# Patient Record
Sex: MALE | Race: WHITE | NOT HISPANIC OR LATINO | URBAN - METROPOLITAN AREA
[De-identification: names, ages, dates, MRNs, and addresses within clinical notes are randomized per-mention and may not be internally consistent; named-entity substitution may affect disease eponyms.]

---

## 2024-03-14 ENCOUNTER — OUTPATIENT (OUTPATIENT)
Dept: OUTPATIENT SERVICES | Facility: HOSPITAL | Age: 84
LOS: 1 days | Discharge: HOME | End: 2024-03-14
Payer: MEDICARE

## 2024-03-14 VITALS
HEIGHT: 69 IN | RESPIRATION RATE: 16 BRPM | HEART RATE: 89 BPM | SYSTOLIC BLOOD PRESSURE: 143 MMHG | OXYGEN SATURATION: 100 % | TEMPERATURE: 98 F | DIASTOLIC BLOOD PRESSURE: 78 MMHG | WEIGHT: 22.05 LBS

## 2024-03-14 DIAGNOSIS — Z98.890 OTHER SPECIFIED POSTPROCEDURAL STATES: Chronic | ICD-10-CM

## 2024-03-14 DIAGNOSIS — Z80.0 FAMILY HISTORY OF MALIGNANT NEOPLASM OF DIGESTIVE ORGANS: ICD-10-CM

## 2024-03-14 PROCEDURE — 88305 TISSUE EXAM BY PATHOLOGIST: CPT

## 2024-03-14 PROCEDURE — 88312 SPECIAL STAINS GROUP 1: CPT | Mod: 26

## 2024-03-14 PROCEDURE — 88305 TISSUE EXAM BY PATHOLOGIST: CPT | Mod: 26

## 2024-03-14 PROCEDURE — 88312 SPECIAL STAINS GROUP 1: CPT

## 2024-03-14 NOTE — ASU PREOP CHECKLIST - PATIENT'S PERSONAL PROPERTY REMOVED
Alert-The patient is alert, awake and responds to voice. The patient is oriented to time, place, and person. The triage nurse is able to obtain subjective information. glasses

## 2024-03-15 LAB — SURGICAL PATHOLOGY STUDY: SIGNIFICANT CHANGE UP

## 2024-03-20 DIAGNOSIS — K21.9 GASTRO-ESOPHAGEAL REFLUX DISEASE WITHOUT ESOPHAGITIS: ICD-10-CM

## 2024-03-20 DIAGNOSIS — Z12.11 ENCOUNTER FOR SCREENING FOR MALIGNANT NEOPLASM OF COLON: ICD-10-CM

## 2024-03-20 DIAGNOSIS — K57.30 DIVERTICULOSIS OF LARGE INTESTINE WITHOUT PERFORATION OR ABSCESS WITHOUT BLEEDING: ICD-10-CM

## 2024-03-20 DIAGNOSIS — R10.13 EPIGASTRIC PAIN: ICD-10-CM

## 2024-03-20 DIAGNOSIS — Z80.0 FAMILY HISTORY OF MALIGNANT NEOPLASM OF DIGESTIVE ORGANS: ICD-10-CM

## 2024-03-20 DIAGNOSIS — D12.0 BENIGN NEOPLASM OF CECUM: ICD-10-CM

## 2024-03-20 DIAGNOSIS — I10 ESSENTIAL (PRIMARY) HYPERTENSION: ICD-10-CM

## 2024-03-20 DIAGNOSIS — R10.11 RIGHT UPPER QUADRANT PAIN: ICD-10-CM

## 2024-03-21 PROBLEM — E78.00 PURE HYPERCHOLESTEROLEMIA, UNSPECIFIED: Chronic | Status: ACTIVE | Noted: 2024-03-14

## 2024-03-21 PROBLEM — I10 ESSENTIAL (PRIMARY) HYPERTENSION: Chronic | Status: ACTIVE | Noted: 2024-03-14

## 2024-03-22 ENCOUNTER — OUTPATIENT (OUTPATIENT)
Dept: OUTPATIENT SERVICES | Facility: HOSPITAL | Age: 84
LOS: 1 days | End: 2024-03-22
Payer: MEDICARE

## 2024-03-22 VITALS
HEART RATE: 79 BPM | RESPIRATION RATE: 16 BRPM | OXYGEN SATURATION: 99 % | SYSTOLIC BLOOD PRESSURE: 140 MMHG | DIASTOLIC BLOOD PRESSURE: 87 MMHG | WEIGHT: 218.92 LBS | HEIGHT: 69 IN | TEMPERATURE: 98 F

## 2024-03-22 DIAGNOSIS — M19.011 PRIMARY OSTEOARTHRITIS, RIGHT SHOULDER: ICD-10-CM

## 2024-03-22 DIAGNOSIS — Z01.818 ENCOUNTER FOR OTHER PREPROCEDURAL EXAMINATION: ICD-10-CM

## 2024-03-22 DIAGNOSIS — Z98.890 OTHER SPECIFIED POSTPROCEDURAL STATES: Chronic | ICD-10-CM

## 2024-03-22 DIAGNOSIS — Z29.9 ENCOUNTER FOR PROPHYLACTIC MEASURES, UNSPECIFIED: ICD-10-CM

## 2024-03-22 DIAGNOSIS — Z98.1 ARTHRODESIS STATUS: Chronic | ICD-10-CM

## 2024-03-22 LAB
ABO RH CONFIRMATION: SIGNIFICANT CHANGE UP
ALBUMIN SERPL ELPH-MCNC: 4.2 G/DL — SIGNIFICANT CHANGE UP (ref 3.3–5)
ALP SERPL-CCNC: 79 U/L — SIGNIFICANT CHANGE UP (ref 40–120)
ALT FLD-CCNC: 20 U/L — SIGNIFICANT CHANGE UP (ref 12–78)
ANION GAP SERPL CALC-SCNC: 6 MMOL/L — SIGNIFICANT CHANGE UP (ref 5–17)
APPEARANCE UR: CLEAR — SIGNIFICANT CHANGE UP
APTT BLD: 31.3 SEC — SIGNIFICANT CHANGE UP (ref 24.5–35.6)
AST SERPL-CCNC: 18 U/L — SIGNIFICANT CHANGE UP (ref 15–37)
BASOPHILS # BLD AUTO: 0.06 K/UL — SIGNIFICANT CHANGE UP (ref 0–0.2)
BASOPHILS NFR BLD AUTO: 0.7 % — SIGNIFICANT CHANGE UP (ref 0–2)
BILIRUB SERPL-MCNC: 0.7 MG/DL — SIGNIFICANT CHANGE UP (ref 0.2–1.2)
BILIRUB UR-MCNC: NEGATIVE — SIGNIFICANT CHANGE UP
BLD GP AB SCN SERPL QL: SIGNIFICANT CHANGE UP
BUN SERPL-MCNC: 22 MG/DL — SIGNIFICANT CHANGE UP (ref 7–23)
CALCIUM SERPL-MCNC: 9.7 MG/DL — SIGNIFICANT CHANGE UP (ref 8.5–10.1)
CHLORIDE SERPL-SCNC: 104 MMOL/L — SIGNIFICANT CHANGE UP (ref 96–108)
CO2 SERPL-SCNC: 27 MMOL/L — SIGNIFICANT CHANGE UP (ref 22–31)
COLOR SPEC: YELLOW — SIGNIFICANT CHANGE UP
CREAT SERPL-MCNC: 1.58 MG/DL — HIGH (ref 0.5–1.3)
DIFF PNL FLD: NEGATIVE — SIGNIFICANT CHANGE UP
EGFR: 43 ML/MIN/1.73M2 — LOW
EOSINOPHIL # BLD AUTO: 0.14 K/UL — SIGNIFICANT CHANGE UP (ref 0–0.5)
EOSINOPHIL NFR BLD AUTO: 1.7 % — SIGNIFICANT CHANGE UP (ref 0–6)
GLUCOSE SERPL-MCNC: 108 MG/DL — HIGH (ref 70–99)
GLUCOSE UR QL: NEGATIVE MG/DL — SIGNIFICANT CHANGE UP
HCT VFR BLD CALC: 50.2 % — HIGH (ref 39–50)
HGB BLD-MCNC: 16.5 G/DL — SIGNIFICANT CHANGE UP (ref 13–17)
IMM GRANULOCYTES NFR BLD AUTO: 0.5 % — SIGNIFICANT CHANGE UP (ref 0–0.9)
INR BLD: 0.96 RATIO — SIGNIFICANT CHANGE UP (ref 0.85–1.18)
KETONES UR-MCNC: NEGATIVE MG/DL — SIGNIFICANT CHANGE UP
LEUKOCYTE ESTERASE UR-ACNC: NEGATIVE — SIGNIFICANT CHANGE UP
LYMPHOCYTES # BLD AUTO: 1.66 K/UL — SIGNIFICANT CHANGE UP (ref 1–3.3)
LYMPHOCYTES # BLD AUTO: 20.1 % — SIGNIFICANT CHANGE UP (ref 13–44)
MCHC RBC-ENTMCNC: 28.3 PG — SIGNIFICANT CHANGE UP (ref 27–34)
MCHC RBC-ENTMCNC: 32.9 GM/DL — SIGNIFICANT CHANGE UP (ref 32–36)
MCV RBC AUTO: 86.1 FL — SIGNIFICANT CHANGE UP (ref 80–100)
MONOCYTES # BLD AUTO: 0.54 K/UL — SIGNIFICANT CHANGE UP (ref 0–0.9)
MONOCYTES NFR BLD AUTO: 6.5 % — SIGNIFICANT CHANGE UP (ref 2–14)
NEUTROPHILS # BLD AUTO: 5.81 K/UL — SIGNIFICANT CHANGE UP (ref 1.8–7.4)
NEUTROPHILS NFR BLD AUTO: 70.5 % — SIGNIFICANT CHANGE UP (ref 43–77)
NITRITE UR-MCNC: NEGATIVE — SIGNIFICANT CHANGE UP
PH UR: 7 — SIGNIFICANT CHANGE UP (ref 5–8)
PLATELET # BLD AUTO: 201 K/UL — SIGNIFICANT CHANGE UP (ref 150–400)
POTASSIUM SERPL-MCNC: 3.9 MMOL/L — SIGNIFICANT CHANGE UP (ref 3.5–5.3)
POTASSIUM SERPL-SCNC: 3.9 MMOL/L — SIGNIFICANT CHANGE UP (ref 3.5–5.3)
PROT SERPL-MCNC: 7.6 GM/DL — SIGNIFICANT CHANGE UP (ref 6–8.3)
PROT UR-MCNC: NEGATIVE MG/DL — SIGNIFICANT CHANGE UP
PROTHROM AB SERPL-ACNC: 10.9 SEC — SIGNIFICANT CHANGE UP (ref 9.5–13)
RBC # BLD: 5.83 M/UL — HIGH (ref 4.2–5.8)
RBC # FLD: 13.2 % — SIGNIFICANT CHANGE UP (ref 10.3–14.5)
SODIUM SERPL-SCNC: 137 MMOL/L — SIGNIFICANT CHANGE UP (ref 135–145)
SP GR SPEC: 1.01 — SIGNIFICANT CHANGE UP (ref 1–1.03)
UROBILINOGEN FLD QL: 1 MG/DL — SIGNIFICANT CHANGE UP (ref 0.2–1)
WBC # BLD: 8.25 K/UL — SIGNIFICANT CHANGE UP (ref 3.8–10.5)
WBC # FLD AUTO: 8.25 K/UL — SIGNIFICANT CHANGE UP (ref 3.8–10.5)

## 2024-03-22 PROCEDURE — 86900 BLOOD TYPING SEROLOGIC ABO: CPT

## 2024-03-22 PROCEDURE — 83036 HEMOGLOBIN GLYCOSYLATED A1C: CPT

## 2024-03-22 PROCEDURE — 87641 MR-STAPH DNA AMP PROBE: CPT

## 2024-03-22 PROCEDURE — 85610 PROTHROMBIN TIME: CPT

## 2024-03-22 PROCEDURE — 86901 BLOOD TYPING SEROLOGIC RH(D): CPT

## 2024-03-22 PROCEDURE — 36415 COLL VENOUS BLD VENIPUNCTURE: CPT

## 2024-03-22 PROCEDURE — 80053 COMPREHEN METABOLIC PANEL: CPT

## 2024-03-22 PROCEDURE — 93005 ELECTROCARDIOGRAM TRACING: CPT

## 2024-03-22 PROCEDURE — 93010 ELECTROCARDIOGRAM REPORT: CPT

## 2024-03-22 PROCEDURE — 86850 RBC ANTIBODY SCREEN: CPT

## 2024-03-22 PROCEDURE — 85730 THROMBOPLASTIN TIME PARTIAL: CPT

## 2024-03-22 PROCEDURE — 99214 OFFICE O/P EST MOD 30 MIN: CPT | Mod: 25

## 2024-03-22 PROCEDURE — 87640 STAPH A DNA AMP PROBE: CPT

## 2024-03-22 PROCEDURE — 81003 URINALYSIS AUTO W/O SCOPE: CPT

## 2024-03-22 PROCEDURE — 85025 COMPLETE CBC W/AUTO DIFF WBC: CPT

## 2024-03-22 RX ORDER — ALLOPURINOL 300 MG
0 TABLET ORAL
Refills: 0 | DISCHARGE

## 2024-03-22 RX ORDER — VALSARTAN 80 MG/1
1 TABLET ORAL
Refills: 0 | DISCHARGE

## 2024-03-22 RX ORDER — PANTOPRAZOLE SODIUM 20 MG/1
1 TABLET, DELAYED RELEASE ORAL
Refills: 0 | DISCHARGE

## 2024-03-22 NOTE — H&P PST ADULT - NSICDXPASTMEDICALHX_GEN_ALL_CORE_FT
PAST MEDICAL HISTORY:  BPH (benign prostatic hyperplasia)     Diverticulosis of large intestine     Dizziness     GERD (gastroesophageal reflux disease)     High cholesterol     History of indigestion     History of kidney stones     [place] (hard of hearing)     HTN (hypertension)     Osteoarthritis of right shoulder     Polyp of cecum

## 2024-03-22 NOTE — H&P PST ADULT - ASSESSMENT
83 years old male present to PST prior to right reverse total shoulder arthroplasty with Dr. Gtz.    Patient denies signs and symptoms of Covid 19 such as shortness of breath/ difficulty breathing, fever/chills, cough, muscle /body ache, headache, loss of taste or smell.    Plan   1. Education and Instructions given to patient regarding upcoming surgery  2. NPO as per ASU  3. Take the following medications with sips of water on the day of procedure: None  4. Use E-Z sponge as directed  5. Use Mupirocin as directed.  6. Drink a quart of extra  fluids the day before your surgery.  7. Medical optimization for surgery with Dr. Shalom Rey  8. CBC, CMP, PT/ INR and PTT, Urinalysis, Type and Screen, MRSA done in [facility] and sent to lab  9. EKG done in [facility]  10. Do not take NSAIDS, Aspirin, Herbal supplements, Multivitamins, Vitamin E or Fish oil 7 days prior to surgery    CAPRINI SCORE [CLOT]    AGE RELATED RISK FACTORS                                                       MOBILITY RELATED FACTORS  [ ] Age 41-60 years                                            (1 Point)                  [ ] Bed rest                                                        (1 Point)  [ ] Age: 61-74 years                                           (2 Points)                 [ ] Plaster cast                                                   (2 Points)  [x ] Age> 75 years                                              (3 Points)                 [ ] Bed bound for more than 72 hours                 (2 Points)    DISEASE RELATED RISK FACTORS                                               GENDER SPECIFIC FACTORS  [ ] Edema in the lower extremities                       (1 Point)                  [ ] Pregnancy                                                     (1 Point)  [ ] Varicose veins                                               (1 Point)                  [ ] Post-partum < 6 weeks                                   (1 Point)             [x ] BMI > 25 Kg/m2                                            (1 Point)                  [ ] Hormonal therapy  or oral contraception          (1 Point)                 [ ] Sepsis (in the previous month)                        (1 Point)                  [ ] History of pregnancy complications                 (1 point)  [ ] Pneumonia or serious lung disease                                               [ ] Unexplained or recurrent                     (1 Point)           (in the previous month)                               (1 Point)  [ ] Abnormal pulmonary function test                     (1 Point)                 SURGERY RELATED RISK FACTORS  [ ] Acute myocardial infarction                              (1 Point)                 [ ]  Section                                             (1 Point)  [ ] Congestive heart failure (in the previous month)  (1 Point)               [ ] Minor surgery                                                  (1 Point)   [ ] Inflammatory bowel disease                             (1 Point)                 [ ] Arthroscopic surgery                                        (2 Points)  [ ] Central venous access                                      (2 Points)                [ ] General surgery lasting more than 45 minutes   (2 Points)       [ ] Stroke (in the previous month)                          (5 Points)               [x ] Elective arthroplasty                                         (5 Points)            [ ] malignancy present or previous                      (2 points)                                                                                                                                   HEMATOLOGY RELATED FACTORS                                                 TRAUMA RELATED RISK FACTORS  [ ] Prior episodes of VTE                                     (3 Points)                 [ ] Fracture of the hip, pelvis, or leg                       (5 Points)  [ ] Positive family history for VTE                         (3 Points)                 [ ] Acute spinal cord injury (in the previous month)  (5 Points)  [ ] Prothrombin 80555 A                                     (3 Points)                 [ ] Paralysis  (less than 1 month)                             (5 Points)  [ ] Factor V Leiden                                             (3 Points)                  [ ] Multiple Trauma within 1 month                        (5 Points)  [ ] Lupus anticoagulants                                     (3 Points)                                                           [ ] Anticardiolipin antibodies                               (3 Points)                                                       [ ] High homocysteine in the blood                      (3 Points)                                             [ ] Other congenital or acquired thrombophilia      (3 Points)                                                [ ] Heparin induced thrombocytopenia                  (3 Points)                                          Total Score [      9    ]

## 2024-03-22 NOTE — H&P PST ADULT - NSANTHOSAYNRD_GEN_A_CORE
No. GENEVA screening performed.  STOP BANG Legend: 0-2 = LOW Risk; 3-4 = INTERMEDIATE Risk; 5-8 = HIGH Risk

## 2024-03-22 NOTE — H&P PST ADULT - HISTORY OF PRESENT ILLNESS
83 years old [place] male with a history of HTN, HLD, BPH, Kidney stones, dizziness, [place] (no hearing aids), recent colonoscopy (benign cecum polyp). Present to PST with osteoarthritis of right shoulder. Reports frequent aching pain to right shoulder radiating to fingers with certain movements. Sharp pain at times. Pain wakes up patient at night. Denies pain medication. Denies pain at present. Past surgery to bilateral shoulders Planned reverse total  replacement.

## 2024-03-22 NOTE — H&P PST ADULT - NSICDXPASTSURGICALHX_GEN_ALL_CORE_FT
PAST SURGICAL HISTORY:  H/O colonoscopy with polypectomy     History of lumbar fusion     History of lumbar laminectomy     History of shoulder surgery

## 2024-03-23 DIAGNOSIS — M19.011 PRIMARY OSTEOARTHRITIS, RIGHT SHOULDER: ICD-10-CM

## 2024-03-23 DIAGNOSIS — Z01.818 ENCOUNTER FOR OTHER PREPROCEDURAL EXAMINATION: ICD-10-CM

## 2024-03-23 LAB
A1C WITH ESTIMATED AVERAGE GLUCOSE RESULT: 5.6 % — SIGNIFICANT CHANGE UP (ref 4–5.6)
ESTIMATED AVERAGE GLUCOSE: 114 MG/DL — SIGNIFICANT CHANGE UP (ref 68–114)
MRSA PCR RESULT.: SIGNIFICANT CHANGE UP
S AUREUS DNA NOSE QL NAA+PROBE: SIGNIFICANT CHANGE UP

## 2024-03-25 RX ORDER — DEXAMETHASONE 0.5 MG/5ML
8 ELIXIR ORAL ONCE
Refills: 0 | Status: COMPLETED | OUTPATIENT
Start: 2024-03-29 | End: 2024-03-29

## 2024-03-28 ENCOUNTER — OUTPATIENT (OUTPATIENT)
Dept: INPATIENT UNIT | Facility: HOSPITAL | Age: 84
LOS: 1 days | Discharge: HOME | End: 2024-03-28
Payer: MEDICARE

## 2024-03-28 VITALS
HEART RATE: 85 BPM | TEMPERATURE: 98 F | OXYGEN SATURATION: 100 % | WEIGHT: 218.92 LBS | HEIGHT: 69 IN | SYSTOLIC BLOOD PRESSURE: 100 MMHG | DIASTOLIC BLOOD PRESSURE: 160 MMHG | RESPIRATION RATE: 12 BRPM

## 2024-03-28 DIAGNOSIS — M19.011 PRIMARY OSTEOARTHRITIS, RIGHT SHOULDER: ICD-10-CM

## 2024-03-28 DIAGNOSIS — Z98.890 OTHER SPECIFIED POSTPROCEDURAL STATES: Chronic | ICD-10-CM

## 2024-03-28 DIAGNOSIS — Z98.1 ARTHRODESIS STATUS: Chronic | ICD-10-CM

## 2024-03-28 LAB
ANION GAP SERPL CALC-SCNC: 7 MMOL/L — SIGNIFICANT CHANGE UP (ref 5–17)
BUN SERPL-MCNC: 20 MG/DL — SIGNIFICANT CHANGE UP (ref 7–23)
CALCIUM SERPL-MCNC: 8.3 MG/DL — LOW (ref 8.5–10.1)
CHLORIDE SERPL-SCNC: 106 MMOL/L — SIGNIFICANT CHANGE UP (ref 96–108)
CO2 SERPL-SCNC: 22 MMOL/L — SIGNIFICANT CHANGE UP (ref 22–31)
CREAT SERPL-MCNC: 1.44 MG/DL — HIGH (ref 0.5–1.3)
EGFR: 48 ML/MIN/1.73M2 — LOW
GLUCOSE BLDC GLUCOMTR-MCNC: 100 MG/DL — HIGH (ref 70–99)
GLUCOSE BLDC GLUCOMTR-MCNC: 144 MG/DL — HIGH (ref 70–99)
GLUCOSE BLDC GLUCOMTR-MCNC: 165 MG/DL — HIGH (ref 70–99)
GLUCOSE SERPL-MCNC: 163 MG/DL — HIGH (ref 70–99)
HCT VFR BLD CALC: 35.8 % — LOW (ref 39–50)
HGB BLD-MCNC: 11.7 G/DL — LOW (ref 13–17)
MCHC RBC-ENTMCNC: 28.2 PG — SIGNIFICANT CHANGE UP (ref 27–34)
MCHC RBC-ENTMCNC: 32.7 GM/DL — SIGNIFICANT CHANGE UP (ref 32–36)
MCV RBC AUTO: 86.3 FL — SIGNIFICANT CHANGE UP (ref 80–100)
PLATELET # BLD AUTO: 135 K/UL — LOW (ref 150–400)
POTASSIUM SERPL-MCNC: 3.8 MMOL/L — SIGNIFICANT CHANGE UP (ref 3.5–5.3)
POTASSIUM SERPL-SCNC: 3.8 MMOL/L — SIGNIFICANT CHANGE UP (ref 3.5–5.3)
RBC # BLD: 4.15 M/UL — LOW (ref 4.2–5.8)
RBC # FLD: 13 % — SIGNIFICANT CHANGE UP (ref 10.3–14.5)
SODIUM SERPL-SCNC: 135 MMOL/L — SIGNIFICANT CHANGE UP (ref 135–145)
WBC # BLD: 11.16 K/UL — HIGH (ref 3.8–10.5)
WBC # FLD AUTO: 11.16 K/UL — HIGH (ref 3.8–10.5)

## 2024-03-28 PROCEDURE — 36415 COLL VENOUS BLD VENIPUNCTURE: CPT

## 2024-03-28 PROCEDURE — 80048 BASIC METABOLIC PNL TOTAL CA: CPT

## 2024-03-28 PROCEDURE — 88300 SURGICAL PATH GROSS: CPT

## 2024-03-28 PROCEDURE — 82962 GLUCOSE BLOOD TEST: CPT

## 2024-03-28 PROCEDURE — C1776: CPT

## 2024-03-28 PROCEDURE — 88300 SURGICAL PATH GROSS: CPT | Mod: 26

## 2024-03-28 PROCEDURE — 73030 X-RAY EXAM OF SHOULDER: CPT | Mod: 26,RT

## 2024-03-28 PROCEDURE — 73030 X-RAY EXAM OF SHOULDER: CPT | Mod: RT

## 2024-03-28 PROCEDURE — C1713: CPT

## 2024-03-28 PROCEDURE — 23472 RECONSTRUCT SHOULDER JOINT: CPT | Mod: AS

## 2024-03-28 PROCEDURE — C9399: CPT

## 2024-03-28 PROCEDURE — 85027 COMPLETE CBC AUTOMATED: CPT

## 2024-03-28 RX ORDER — SENNA PLUS 8.6 MG/1
2 TABLET ORAL
Qty: 28 | Refills: 0
Start: 2024-03-28 | End: 2024-04-10

## 2024-03-28 RX ORDER — CEFAZOLIN SODIUM 1 G
2000 VIAL (EA) INJECTION EVERY 8 HOURS
Refills: 0 | Status: COMPLETED | OUTPATIENT
Start: 2024-03-28 | End: 2024-03-29

## 2024-03-28 RX ORDER — OXYCODONE HYDROCHLORIDE 5 MG/1
1 TABLET ORAL
Qty: 30 | Refills: 0
Start: 2024-03-28 | End: 2024-04-01

## 2024-03-28 RX ORDER — SODIUM CHLORIDE 9 MG/ML
1000 INJECTION, SOLUTION INTRAVENOUS
Refills: 0 | Status: DISCONTINUED | OUTPATIENT
Start: 2024-03-28 | End: 2024-03-29

## 2024-03-28 RX ORDER — ONDANSETRON 8 MG/1
4 TABLET, FILM COATED ORAL EVERY 6 HOURS
Refills: 0 | Status: DISCONTINUED | OUTPATIENT
Start: 2024-03-28 | End: 2024-03-29

## 2024-03-28 RX ORDER — ACETAMINOPHEN 500 MG
2 TABLET ORAL
Qty: 84 | Refills: 0
Start: 2024-03-28 | End: 2024-04-10

## 2024-03-28 RX ORDER — HYDROMORPHONE HYDROCHLORIDE 2 MG/ML
0.5 INJECTION INTRAMUSCULAR; INTRAVENOUS; SUBCUTANEOUS EVERY 4 HOURS
Refills: 0 | Status: DISCONTINUED | OUTPATIENT
Start: 2024-03-28 | End: 2024-03-29

## 2024-03-28 RX ORDER — SIMVASTATIN 20 MG/1
40 TABLET, FILM COATED ORAL AT BEDTIME
Refills: 0 | Status: DISCONTINUED | OUTPATIENT
Start: 2024-03-28 | End: 2024-03-29

## 2024-03-28 RX ORDER — ALLOPURINOL 300 MG
1 TABLET ORAL
Refills: 0 | DISCHARGE

## 2024-03-28 RX ORDER — ONDANSETRON 8 MG/1
1 TABLET, FILM COATED ORAL
Qty: 15 | Refills: 0
Start: 2024-03-28 | End: 2024-04-01

## 2024-03-28 RX ORDER — ASPIRIN/CALCIUM CARB/MAGNESIUM 324 MG
1 TABLET ORAL
Qty: 14 | Refills: 0
Start: 2024-03-28 | End: 2024-04-10

## 2024-03-28 RX ORDER — POLYETHYLENE GLYCOL 3350 17 G/17G
17 POWDER, FOR SOLUTION ORAL
Qty: 1 | Refills: 0
Start: 2024-03-28 | End: 2024-04-10

## 2024-03-28 RX ORDER — HYDROMORPHONE HYDROCHLORIDE 2 MG/ML
0.5 INJECTION INTRAMUSCULAR; INTRAVENOUS; SUBCUTANEOUS
Refills: 0 | Status: DISCONTINUED | OUTPATIENT
Start: 2024-03-28 | End: 2024-03-29

## 2024-03-28 RX ORDER — CEFAZOLIN SODIUM 1 G
2000 VIAL (EA) INJECTION EVERY 8 HOURS
Refills: 0 | Status: DISCONTINUED | OUTPATIENT
Start: 2024-03-28 | End: 2024-03-28

## 2024-03-28 RX ORDER — OXYCODONE HYDROCHLORIDE 5 MG/1
10 TABLET ORAL EVERY 4 HOURS
Refills: 0 | Status: DISCONTINUED | OUTPATIENT
Start: 2024-03-28 | End: 2024-03-29

## 2024-03-28 RX ORDER — OXYCODONE HYDROCHLORIDE 5 MG/1
5 TABLET ORAL ONCE
Refills: 0 | Status: DISCONTINUED | OUTPATIENT
Start: 2024-03-28 | End: 2024-03-28

## 2024-03-28 RX ORDER — PANTOPRAZOLE SODIUM 20 MG/1
40 TABLET, DELAYED RELEASE ORAL ONCE
Refills: 0 | Status: COMPLETED | OUTPATIENT
Start: 2024-03-28 | End: 2024-03-28

## 2024-03-28 RX ORDER — ALLOPURINOL 300 MG
100 TABLET ORAL DAILY
Refills: 0 | Status: DISCONTINUED | OUTPATIENT
Start: 2024-03-28 | End: 2024-03-29

## 2024-03-28 RX ORDER — SODIUM CHLORIDE 9 MG/ML
1000 INJECTION, SOLUTION INTRAVENOUS
Refills: 0 | Status: DISCONTINUED | OUTPATIENT
Start: 2024-03-28 | End: 2024-03-28

## 2024-03-28 RX ORDER — PANTOPRAZOLE SODIUM 20 MG/1
40 TABLET, DELAYED RELEASE ORAL
Refills: 0 | Status: DISCONTINUED | OUTPATIENT
Start: 2024-03-28 | End: 2024-03-29

## 2024-03-28 RX ORDER — ACETAMINOPHEN 500 MG
1000 TABLET ORAL EVERY 8 HOURS
Refills: 0 | Status: DISCONTINUED | OUTPATIENT
Start: 2024-03-28 | End: 2024-03-29

## 2024-03-28 RX ORDER — PANTOPRAZOLE SODIUM 20 MG/1
1 TABLET, DELAYED RELEASE ORAL
Refills: 0 | DISCHARGE

## 2024-03-28 RX ORDER — ONDANSETRON 8 MG/1
4 TABLET, FILM COATED ORAL ONCE
Refills: 0 | Status: DISCONTINUED | OUTPATIENT
Start: 2024-03-28 | End: 2024-03-28

## 2024-03-28 RX ORDER — FENTANYL CITRATE 50 UG/ML
25 INJECTION INTRAVENOUS
Refills: 0 | Status: DISCONTINUED | OUTPATIENT
Start: 2024-03-28 | End: 2024-03-28

## 2024-03-28 RX ORDER — OXYCODONE HYDROCHLORIDE 5 MG/1
5 TABLET ORAL EVERY 4 HOURS
Refills: 0 | Status: DISCONTINUED | OUTPATIENT
Start: 2024-03-28 | End: 2024-03-29

## 2024-03-28 RX ORDER — CELECOXIB 200 MG/1
200 CAPSULE ORAL EVERY 12 HOURS
Refills: 0 | Status: DISCONTINUED | OUTPATIENT
Start: 2024-03-28 | End: 2024-03-29

## 2024-03-28 RX ORDER — METOCLOPRAMIDE HCL 10 MG
5 TABLET ORAL EVERY OTHER DAY
Refills: 0 | Status: DISCONTINUED | OUTPATIENT
Start: 2024-03-29 | End: 2024-03-29

## 2024-03-28 RX ORDER — POLYETHYLENE GLYCOL 3350 17 G/17G
17 POWDER, FOR SOLUTION ORAL AT BEDTIME
Refills: 0 | Status: DISCONTINUED | OUTPATIENT
Start: 2024-03-28 | End: 2024-03-29

## 2024-03-28 RX ORDER — SENNA PLUS 8.6 MG/1
2 TABLET ORAL AT BEDTIME
Refills: 0 | Status: DISCONTINUED | OUTPATIENT
Start: 2024-03-28 | End: 2024-03-29

## 2024-03-28 RX ORDER — VALSARTAN 80 MG/1
160 TABLET ORAL DAILY
Refills: 0 | Status: DISCONTINUED | OUTPATIENT
Start: 2024-03-28 | End: 2024-03-29

## 2024-03-28 RX ADMIN — OXYCODONE HYDROCHLORIDE 10 MILLIGRAM(S): 5 TABLET ORAL at 20:32

## 2024-03-28 RX ADMIN — FENTANYL CITRATE 25 MICROGRAM(S): 50 INJECTION INTRAVENOUS at 11:28

## 2024-03-28 RX ADMIN — HYDROMORPHONE HYDROCHLORIDE 0.5 MILLIGRAM(S): 2 INJECTION INTRAMUSCULAR; INTRAVENOUS; SUBCUTANEOUS at 12:33

## 2024-03-28 RX ADMIN — FENTANYL CITRATE 25 MICROGRAM(S): 50 INJECTION INTRAVENOUS at 11:57

## 2024-03-28 RX ADMIN — HYDROMORPHONE HYDROCHLORIDE 0.5 MILLIGRAM(S): 2 INJECTION INTRAMUSCULAR; INTRAVENOUS; SUBCUTANEOUS at 12:59

## 2024-03-28 RX ADMIN — SIMVASTATIN 40 MILLIGRAM(S): 20 TABLET, FILM COATED ORAL at 21:59

## 2024-03-28 RX ADMIN — SENNA PLUS 2 TABLET(S): 8.6 TABLET ORAL at 21:59

## 2024-03-28 RX ADMIN — CELECOXIB 200 MILLIGRAM(S): 200 CAPSULE ORAL at 21:59

## 2024-03-28 RX ADMIN — OXYCODONE HYDROCHLORIDE 10 MILLIGRAM(S): 5 TABLET ORAL at 20:02

## 2024-03-28 RX ADMIN — OXYCODONE HYDROCHLORIDE 5 MILLIGRAM(S): 5 TABLET ORAL at 12:43

## 2024-03-28 RX ADMIN — FENTANYL CITRATE 25 MICROGRAM(S): 50 INJECTION INTRAVENOUS at 11:48

## 2024-03-28 RX ADMIN — Medication 2000 MILLIGRAM(S): at 21:59

## 2024-03-28 RX ADMIN — HYDROMORPHONE HYDROCHLORIDE 0.5 MILLIGRAM(S): 2 INJECTION INTRAMUSCULAR; INTRAVENOUS; SUBCUTANEOUS at 18:45

## 2024-03-28 RX ADMIN — Medication 1000 MILLIGRAM(S): at 21:59

## 2024-03-28 RX ADMIN — PANTOPRAZOLE SODIUM 40 MILLIGRAM(S): 20 TABLET, DELAYED RELEASE ORAL at 06:36

## 2024-03-28 RX ADMIN — OXYCODONE HYDROCHLORIDE 5 MILLIGRAM(S): 5 TABLET ORAL at 11:48

## 2024-03-28 RX ADMIN — OXYCODONE HYDROCHLORIDE 5 MILLIGRAM(S): 5 TABLET ORAL at 11:46

## 2024-03-28 RX ADMIN — OXYCODONE HYDROCHLORIDE 5 MILLIGRAM(S): 5 TABLET ORAL at 11:23

## 2024-03-28 RX ADMIN — FENTANYL CITRATE 25 MICROGRAM(S): 50 INJECTION INTRAVENOUS at 12:43

## 2024-03-28 RX ADMIN — HYDROMORPHONE HYDROCHLORIDE 0.5 MILLIGRAM(S): 2 INJECTION INTRAMUSCULAR; INTRAVENOUS; SUBCUTANEOUS at 13:30

## 2024-03-28 RX ADMIN — HYDROMORPHONE HYDROCHLORIDE 0.5 MILLIGRAM(S): 2 INJECTION INTRAMUSCULAR; INTRAVENOUS; SUBCUTANEOUS at 19:10

## 2024-03-28 RX ADMIN — SODIUM CHLORIDE 75 MILLILITER(S): 9 INJECTION, SOLUTION INTRAVENOUS at 22:03

## 2024-03-28 RX ADMIN — POLYETHYLENE GLYCOL 3350 17 GRAM(S): 17 POWDER, FOR SOLUTION ORAL at 22:00

## 2024-03-28 NOTE — ASU DISCHARGE PLAN (ADULT/PEDIATRIC) - CARE PROVIDER_API CALL
Debi Gtz  Orthopaedic Surgery  [address] [place] [address]  Phone: [phone]  Fax: [phone]  Follow Up Time:

## 2024-03-28 NOTE — DISCHARGE NOTE PROVIDER - CARE PROVIDER_API CALL
Debi Gtz  Orthopaedic Surgery  [address] [place] [address]  Phone: [phone]  Fax: [phone]  Follow Up Time:

## 2024-03-28 NOTE — PROGRESS NOTE ADULT - SUBJECTIVE AND OBJECTIVE BOX
Patient tolerated the procedure well. Patient seen and examined at bedside. No acute complaints at this time. Pain well controlled. Denies chest pain, shortness of breath, nausea or vomiting.     LABS:                VITAL SIGNS:  T(C): 36.4 (03-28-24 @ 10:48), Max: 36.9 (03-28-24 @ 06:29)  HR: 94 (03-28-24 @ 13:15) (74 - 97)  BP: 144/83 (03-28-24 @ 13:15) (100/160 - 145/83)  RR: 18 (03-28-24 @ 13:15) (12 - 20)  SpO2: 98% (03-28-24 @ 13:15) (93% - 100%)    General: NAD, resting comfortably in bed  RUE:   Dressing C/D/I  Compartments soft and compressible  No calf tenderness bilaterally  Axillary/Musculocutaneous/Radial/Median/AIN/PIN unable to assess 2/2 continued effects of block  SILT C5-T1  2+ radial pulses            A/P:  83y m s/p R rTSA POD 0  -PT/OT   -NWB RUE in sling  -Pain Control  -DVT ppx SCDs  -Continue perioperative abx x 24 hours  -FU AM Labs  -Rest, ice, compress and elevate the extremity as we needed  -Incentive Spirometry  -Medical management appreciated  -Dispo pending PT eval  -Will discuss with attending and will advise if plan changes

## 2024-03-28 NOTE — DISCHARGE NOTE PROVIDER - HOSPITAL COURSE
H&P:  Pt is a 83y Male    PAST MEDICAL & SURGICAL HISTORY:  HTN (hypertension)      High cholesterol      Dizziness      [place] (hard of hearing)      History of kidney stones      BPH (benign prostatic hyperplasia)      GERD (gastroesophageal reflux disease)      Polyp of cecum      Diverticulosis of large intestine      Osteoarthritis of right shoulder      History of indigestion      H/O colonoscopy with polypectomy      History of lumbar laminectomy      History of lumbar fusion      History of shoulder surgery           Now s/p Right Reverse Total Shoulder Arthroplasty. Pt is afebrile with stable vital signs. Pain is controlled. Alert and Oriented. Exam reveals intact AIN/PIN, wrist flexion and wrist extension, 2+Radial Pulse. Dressing is clean and dry.    Hospital Course:  Patient presented to [facility] medically cleared for Right Reverse Total Shoulder Arthroplasty Replacement Surgery, having failed outpatient non-operative conservative management. Prophylactic antibiotics were started before the procedure and continued for 24 hours. They were admitted after surgery to the orthopedic floor.  There were no complications during the hospital stay.     Routine consult was obtained from Physical Therapy and Occupational Therapy. Consult to Hospitalist for Medical Co-management. Patient was placed on anticoagulation post-operatively while inhouse.  Pertinent home medications were continued.  Daily labs were followed.      There were no post-operative issues and patient was OOB with PT/OT. Anticipate POD 0 DC to home. The orthopedic Attending is aware and agrees.

## 2024-03-28 NOTE — DISCHARGE NOTE PROVIDER - NSDCMRMEDTOKEN_GEN_ALL_CORE_FT
allopurinol 100 mg oral tablet: 1 tab(s) orally once a day  Aspirin  mg oral delayed release tablet: 1 tab(s) orally once a day  Betaserc 24 mtablet orally 2 times a day (European Brand)  MiraLax oral powder for reconstitution: 17 gram(s) orally once a day as needed for  constipation  ondansetron 4 mg oral tablet: 1 tab(s) orally every 8 hours as needed for  nausea  oxyCODONE 5 mg oral tablet: 1 tab(s) orally every 4 hours as needed for  severe pain MDD: 6  pantoprazole 40 mg oral delayed release tablet: 1 tab(s) orally once a day  Senna 8.6 mg oral tablet: 2 tab(s) orally once a day (at bedtime) as needed for  constipation  Tomid 5m tablet every 2 days for indigestion (European Brand)  Tylenol Extra Strength 500 mg oral tablet: 2 tab(s) orally every 8 hours  Valsacombi 160mg/12.5 mg: once  a day in the morning (European Brand)  Vasilip 40 mg: one tablet orally once a day (European brand)

## 2024-03-28 NOTE — ASU PATIENT PROFILE, ADULT - FALL HARM RISK - UNIVERSAL INTERVENTIONS
Bed in lowest position, wheels locked, appropriate side rails in place/Call bell, personal items and telephone in reach/Non-slip footwear when patient is out of bed/[place] to call system/Physically safe environment - no spills, clutter or unnecessary equipment/Purposeful Proactive Rounding/Room/bathroom lighting operational, light cord in reach

## 2024-03-28 NOTE — DISCHARGE NOTE PROVIDER - NSDCFUADDINST_GEN_ALL_CORE_FT
Discharge Instructions for Right Reverse Total Shoulder Arthroplasty:    1. Activity: No ROM until seen by Dr. Blackwell. Maintain sling at all times. Ok to straighten elbow with arm braced at your side so elbow doesn't get stiff. Wiggle your fingers often and exercise your hand.     2. Call with fever over 101, wound redness, drainage.    3. Wound care: Keep bandage on. Do not remove it. It will be removed in the office. There are no stitches or staples to remove.     4. Bathing: Recommend sponge bathing.  IF you shower it must be with help of another person and with a detachable shower head from waist down only so you keep your bandage dry.     5. Continue to apply ICE packs.    6. DVT PE Prophylaxis: Aspirin 35mg PO daily for 14 days. See med Rec.    7. Follow Up: Orthopedics, Dr. BLACKWELL  7-10 days after discharage from hospital. Call to schedule. eRx sent to your pharmacy for .

## 2024-03-28 NOTE — ASU DISCHARGE PLAN (ADULT/PEDIATRIC) - ASU DC SPECIAL INSTRUCTIONSFT
1. Do not lift anything or bear weight on your right arm. Keep in immobilizer.   2. Keep dressing clean, dry, intact.  3. Take medications as prescribed.  4. Follow up with Dr. Gtz in 10-14 days. 1. Do not lift anything or bear weight on your right arm. Keep in immobilizer until follow up appointment.   2. Keep dressing clean, dry, intact until Monday. Then may remove dressing and may shower allowing water to run over incision but do not scrub or submerge.   3. Take medications as prescribed.  4. Follow up with Dr. Gtz in 1 week. Call office to schedule.

## 2024-03-28 NOTE — ASU PATIENT PROFILE, ADULT - NSICDXPASTMEDICALHX_GEN_ALL_CORE_FT
PAST MEDICAL HISTORY:  BPH (benign prostatic hyperplasia)     Diverticulosis of large intestine     Dizziness     GERD (gastroesophageal reflux disease)     High cholesterol     History of indigestion     History of kidney stones     [place] (hard of hearing)     HTN (hypertension)     Osteoarthritis of right shoulder     Polyp of cecum

## 2024-03-28 NOTE — ASU PATIENT PROFILE, ADULT - AS SC BRADEN MOBILITY
Patient states he is feeling much better. States no concerns. Call light within reach      (4) no limitation

## 2024-03-28 NOTE — ASU DISCHARGE PLAN (ADULT/PEDIATRIC) - NS MD DC FALL RISK RISK
For information on Fall & Injury Prevention, visit: [email or web address] OR  [email or web address] OR  https://www.cdc.gov/steadi/patient.html

## 2024-03-29 VITALS
DIASTOLIC BLOOD PRESSURE: 58 MMHG | RESPIRATION RATE: 18 BRPM | TEMPERATURE: 98 F | OXYGEN SATURATION: 96 % | SYSTOLIC BLOOD PRESSURE: 116 MMHG | HEART RATE: 63 BPM

## 2024-03-29 LAB
ANION GAP SERPL CALC-SCNC: 5 MMOL/L — SIGNIFICANT CHANGE UP (ref 5–17)
BUN SERPL-MCNC: 26 MG/DL — HIGH (ref 7–23)
CALCIUM SERPL-MCNC: 8.6 MG/DL — SIGNIFICANT CHANGE UP (ref 8.5–10.1)
CHLORIDE SERPL-SCNC: 102 MMOL/L — SIGNIFICANT CHANGE UP (ref 96–108)
CO2 SERPL-SCNC: 24 MMOL/L — SIGNIFICANT CHANGE UP (ref 22–31)
CREAT SERPL-MCNC: 1.67 MG/DL — HIGH (ref 0.5–1.3)
EGFR: 40 ML/MIN/1.73M2 — LOW
GLUCOSE SERPL-MCNC: 127 MG/DL — HIGH (ref 70–99)
HCT VFR BLD CALC: 36.7 % — LOW (ref 39–50)
HGB BLD-MCNC: 12.1 G/DL — LOW (ref 13–17)
MCHC RBC-ENTMCNC: 28.4 PG — SIGNIFICANT CHANGE UP (ref 27–34)
MCHC RBC-ENTMCNC: 33 GM/DL — SIGNIFICANT CHANGE UP (ref 32–36)
MCV RBC AUTO: 86.2 FL — SIGNIFICANT CHANGE UP (ref 80–100)
PLATELET # BLD AUTO: 148 K/UL — LOW (ref 150–400)
POTASSIUM SERPL-MCNC: 4.9 MMOL/L — SIGNIFICANT CHANGE UP (ref 3.5–5.3)
POTASSIUM SERPL-SCNC: 4.9 MMOL/L — SIGNIFICANT CHANGE UP (ref 3.5–5.3)
RBC # BLD: 4.26 M/UL — SIGNIFICANT CHANGE UP (ref 4.2–5.8)
RBC # FLD: 13.1 % — SIGNIFICANT CHANGE UP (ref 10.3–14.5)
SODIUM SERPL-SCNC: 131 MMOL/L — LOW (ref 135–145)
WBC # BLD: 10.07 K/UL — SIGNIFICANT CHANGE UP (ref 3.8–10.5)
WBC # FLD AUTO: 10.07 K/UL — SIGNIFICANT CHANGE UP (ref 3.8–10.5)

## 2024-03-29 RX ORDER — SODIUM CHLORIDE 9 MG/ML
500 INJECTION, SOLUTION INTRAVENOUS ONCE
Refills: 0 | Status: COMPLETED | OUTPATIENT
Start: 2024-03-29 | End: 2024-03-29

## 2024-03-29 RX ORDER — SODIUM CHLORIDE 9 MG/ML
1000 INJECTION INTRAMUSCULAR; INTRAVENOUS; SUBCUTANEOUS
Refills: 0 | Status: DISCONTINUED | OUTPATIENT
Start: 2024-03-29 | End: 2024-03-29

## 2024-03-29 RX ADMIN — OXYCODONE HYDROCHLORIDE 10 MILLIGRAM(S): 5 TABLET ORAL at 05:07

## 2024-03-29 RX ADMIN — Medication 1000 MILLIGRAM(S): at 05:07

## 2024-03-29 RX ADMIN — PANTOPRAZOLE SODIUM 40 MILLIGRAM(S): 20 TABLET, DELAYED RELEASE ORAL at 05:07

## 2024-03-29 RX ADMIN — SODIUM CHLORIDE 500 MILLILITER(S): 9 INJECTION, SOLUTION INTRAVENOUS at 12:16

## 2024-03-29 RX ADMIN — Medication 100 MILLIGRAM(S): at 10:35

## 2024-03-29 RX ADMIN — Medication 101.6 MILLIGRAM(S): at 06:00

## 2024-03-29 RX ADMIN — OXYCODONE HYDROCHLORIDE 10 MILLIGRAM(S): 5 TABLET ORAL at 05:37

## 2024-03-29 RX ADMIN — CELECOXIB 200 MILLIGRAM(S): 200 CAPSULE ORAL at 10:33

## 2024-03-29 RX ADMIN — OXYCODONE HYDROCHLORIDE 10 MILLIGRAM(S): 5 TABLET ORAL at 10:35

## 2024-03-29 RX ADMIN — Medication 2000 MILLIGRAM(S): at 06:00

## 2024-03-29 RX ADMIN — Medication 5 MILLIGRAM(S): at 10:35

## 2024-03-29 RX ADMIN — VALSARTAN 160 MILLIGRAM(S): 80 TABLET ORAL at 10:35

## 2024-03-29 NOTE — OCCUPATIONAL THERAPY INITIAL EVALUATION ADULT - PRECAUTIONS/LIMITATIONS, REHAB EVAL
diet: regular, NO shoulder ROM, elbow/wrist/hand ROM okay/fall precautions/surgical precautions diet: regular, NO shoulder ROM, elbow/wrist/hand ROM okay/fall precautions/hearing precautions/surgical precautions

## 2024-03-29 NOTE — OCCUPATIONAL THERAPY INITIAL EVALUATION ADULT - PERTINENT HX OF CURRENT PROBLEM, REHAB EVAL
Per PST H&P- pt is a 83 y/p [place] male with a PMHx of HTN, HLD, BPH, Kidney stones, dizziness, [place] (no hearing aids), recent colonoscopy (benign cecum polyp). Present to PST with osteoarthritis of right shoulder. Reports frequent aching pain to right shoulder radiating to fingers with certain movements. Sharp pain at times. Pain wakes up patient at night. Past surgery to bilateral shoulders. Pt is now s/p R rTSA.

## 2024-03-29 NOTE — OCCUPATIONAL THERAPY INITIAL EVALUATION ADULT - NSOTDISCHREC_GEN_A_CORE
home with family SUPERVISION and assist 2* cognition/decreased safety awareness, and assist needed for ADL/IADL tasks

## 2024-03-29 NOTE — OCCUPATIONAL THERAPY INITIAL EVALUATION ADULT - NSACTIVITYREC_GEN_A_OT
Pt presents with impaired balance, endurance and muscle strength that will benefit from skilled OT to improve independence in ADLs, reduce fall risk and chance for readmission. Pt educated extensively on ROM and shoulder precautions for RUE and impact on ADL/IADL tasks- handout provided to promote carryover. Patient educated regarding fall prevention and home/hospital safety. Pt educated on use of AE/DME recommended for safety & the need to call for assistance. Role of OT and patient goals discussed.

## 2024-03-29 NOTE — OCCUPATIONAL THERAPY INITIAL EVALUATION ADULT - PERSONAL SAFETY AND JUDGMENT, REHAB EVAL
pt with poor safety awareness, adherence and insight to deficits/precautions post surgery/impaired/at risk behaviors demonstrated

## 2024-03-29 NOTE — PROGRESS NOTE ADULT - SUBJECTIVE AND OBJECTIVE BOX
Patient seen and examined at bedside. Pain well controlled with medication. Patient denies any numbness, tingling, weakness, or any other orthopaedic complaint. Denies N/V/CP/SOB.    LABS:                        11.7   11.16 )-----------( 135      ( 28 Mar 2024 12:56 )             35.8     03-28    135  |  106  |  20  ----------------------------<  163<H>  3.8   |  22  |  1.44<H>    Ca    8.3<L>      28 Mar 2024 12:56        Urinalysis Basic - ( 28 Mar 2024 12:56 )    Color: x / Appearance: x / SG: x / pH: x  Gluc: 163 mg/dL / Ketone: x  / Bili: x / Urobili: x   Blood: x / Protein: x / Nitrite: x   Leuk Esterase: x / RBC: x / WBC x   Sq Epi: x / Non Sq Epi: x / Bacteria: x        VITAL SIGNS:  T(C): 36.4 (03-29-24 @ 04:00), Max: 36.9 (03-28-24 @ 06:29)  HR: 79 (03-29-24 @ 04:00) (74 - 103)  BP: 120/64 (03-29-24 @ 04:00) (100/160 - 145/83)  RR: 17 (03-29-24 @ 04:00) (12 - 21)  SpO2: 97% (03-29-24 @ 04:00) (93% - 100%)    General: NAD, resting comfortably in bed  RUE:   Dressing C/D/I  Compartments soft and compressible  No calf tenderness bilaterally  +Axillary/Musculocutaneous/Radial/Median/AIN/PIN   SILT C5-T1  2+ radial pulses            A/P:  83y m s/p R rTSA POD 1  -PT/OT   -NWB RUE in sling  -Pain Control  -DVT ppx SCDs  -Continue perioperative abx x 24 hours  -FU AM Labs  -Rest, ice, compress and elevate the extremity as we needed  -Incentive Spirometry  -Medical management appreciated  -Dispo pending PT eval  -Will discuss with attending and will advise if plan changes

## 2024-03-29 NOTE — OCCUPATIONAL THERAPY INITIAL EVALUATION ADULT - MODALITIES TREATMENT COMMENTS
Pt left seated in bedside chair, RN and PCA notified, chair alarmed, pt instructed not to get up on own, immobilizer on RUE intact, CB/TT/phone IR, needs met.

## 2024-03-29 NOTE — OCCUPATIONAL THERAPY INITIAL EVALUATION ADULT - GENERAL OBSERVATIONS, REHAB EVAL
Pt rec'd semi-supine in bed, bed alarmed, +shoulder immobilizer on RUE, lines intact, agreeable to OT IE

## 2024-03-29 NOTE — OCCUPATIONAL THERAPY INITIAL EVALUATION ADULT - MD ORDER
"OT Evaluate and Treat"- MD orders received. Chart reviewed, contents noted, conferred with RN "OT Evaluate and Treat"- MD orders received. Chart reviewed, contents noted, conferred with RN, pt when OOB c/o dizziness VSS: 142/66. Pt with poor safety awareness and adherence to precautions, reports he used the overhead trapeze to perform ROM on his RUE earlier today, educated patient on his surgery precautions and NO shoulder ROM allowed until cleared by MD, notified RN./ "OT Evaluate and Treat"- MD orders received. Chart reviewed, contents noted, conferred with RN, pt when OOB c/o dizziness VSS: 142/66. Pt with poor safety awareness and adherence to precautions, reports he used the overhead trapeze to perform ROM on his RUE earlier today, educated patient on his surgery precautions and NO shoulder ROM allowed until cleared by MD, notified RN. Pt performed fxl mobility without AD with supervision, car transfer and stairs (+HR on L side) with supervision.

## 2024-03-29 NOTE — OCCUPATIONAL THERAPY INITIAL EVALUATION ADULT - ADL RETRAINING, OT EVAL
Pt will perform UBD (including sling) with CGA while adhering to ROM precautions with 100% accuracy in 3-5 tx sessions. Pt will perform LBD and toileting with supervision assist in 3-5 tx sessions.

## 2024-03-29 NOTE — OCCUPATIONAL THERAPY INITIAL EVALUATION ADULT - COGNITIVE, VISUAL PERCEPTUAL, OT EVAL
Pt will adhere to and recall RUE shoulder ROM and NWB precautions with 100% accuracy in 3-5 tx sessions. Pt will perform distal RUE exercises on HEP (within precautions) with 100% accuracy.

## 2024-03-29 NOTE — DISCHARGE NOTE NURSING/CASE MANAGEMENT/SOCIAL WORK - PATIENT PORTAL LINK FT
You can access the FollowMyHealth Patient Portal offered by [facility] by registering at the following website: [email or web address]. By joining [employer]’s FollowMyHealth portal, you will also be able to view your health information using other applications (apps) compatible with our system.

## 2024-04-01 LAB — SURGICAL PATHOLOGY STUDY: SIGNIFICANT CHANGE UP

## 2024-04-03 DIAGNOSIS — K21.9 GASTRO-ESOPHAGEAL REFLUX DISEASE WITHOUT ESOPHAGITIS: ICD-10-CM

## 2024-04-03 DIAGNOSIS — Z87.891 PERSONAL HISTORY OF NICOTINE DEPENDENCE: ICD-10-CM

## 2024-04-03 DIAGNOSIS — M19.011 PRIMARY OSTEOARTHRITIS, RIGHT SHOULDER: ICD-10-CM

## 2024-04-03 DIAGNOSIS — I10 ESSENTIAL (PRIMARY) HYPERTENSION: ICD-10-CM

## 2024-04-03 DIAGNOSIS — E78.00 PURE HYPERCHOLESTEROLEMIA, UNSPECIFIED: ICD-10-CM

## 2024-04-03 DIAGNOSIS — M65.9 SYNOVITIS AND TENOSYNOVITIS, UNSPECIFIED: ICD-10-CM

## 2024-04-03 DIAGNOSIS — Z98.1 ARTHRODESIS STATUS: ICD-10-CM

## 2024-06-04 NOTE — ASU PATIENT PROFILE, ADULT - FALL HARM RISK - ATTEMPT OOB
No Patient provided with pre-operative instructions and verbalized understanding.  Patient can take atenolol but otherwise will be NPO on day of surgery. Patient will stop NSAIDs, aspirin, herbal supplements or vitamins 1 week prior to surgery.        Pending medical clearance as per surgeon.

## 2024-06-05 NOTE — PROCEDURAL SAFETY CHECKLIST WITH OR WITHOUT SEDATION - NSTEAMNURSEFT_GEN_ALL_CORE
[FreeTextEntry1] : 1. Dizziness: EKG no changes from previous. No orthostatic hypotension noted, BP remained the same sitting/standing. Advised cardiology eval. Blood work script given.  2. HLD: Low fat/low cholesterol diet, repeat fasting lipids, script given.  3. HTN: Continue Metoprolol 25mg daily, advised to monitor BP at home. Limit sodium intake to 2g daily, weekly exercise of 150 minutes or more.  4. Anxiety and depression: Mood stable, does not want medications or therapist at this time.   5. Tick bite: Asymptomatic, advised lyme titer in 6 weeks, sooner if symptoms begin.  Follow up 3 months. Script given to complete fasting blood work.  
Evelyne Serrato RN, Peter CANELA